# Patient Record
(demographics unavailable — no encounter records)

---

## 2025-04-25 NOTE — ASSESSMENT
[FreeTextEntry1] : 1.  Hypothyroidism secondary to Hashimoto's thyroiditis - Stop levothyroxine 50 mcg daily in 5/2024 as it was started for mild subclinical hypothyroidism with TSH 5.57 during fertility workup. -Check TSH and free T4 today to reassess need for levothyroxine.  We discussed indications for starting levothyroxine such as TSH more than 10, specific significant symptoms of hypothyroidism with elevated TSH, or active plans for pregnancy.  She does not have any active plans for pregnancy at this time, using IUD.  2.  Hyperlipidemia LDL as high as 189 in 9/2023, recent labs 5/10/2024 with . -Continue lifestyle modifications for lipid lowering, check lipid panel today.  3.  PCOS Using IUD for contraception. - Check testosterone level.  Complaining of hormonal acne on her face.  If testosterone level is elevated, we discussed potentially trying spironolactone to see if any improvement.  RTC in 1 year.  Sooner if needed.  Nathalia Benson MD [facility] at [address], [address] Ph: [phone] Fax: [phone]

## 2025-04-25 NOTE — HISTORY OF PRESENT ILLNESS
[FreeTextEntry1] : CHIEF COMPLAINT: Hypothyroidism   HISTORY OF PRESENTING ILLNESS: The patient is a 34-year-old female being seen in the office today for evaluation of hypothyroidism.  Also with hyperlipidemia.  She was diagnosed with hypothyroidism secondary to Hashimoto's thyroiditis in 2019 during workup for infertility, when she was noted to have a TSH of 5.57.  She had been taking levothyroxine 50 mcg daily until May 2024, when we decided to trial stopping levothyroxine. TFTs from 5/10/2024: TSH 1.18, free T4 1.58. She has 2 kids, born in 3/20/2022 and 8/20/2023.   No further plans for pregnancy.  After stopping levothyroxine, she is reports that she feels pretty much the same.  Has not repeated labs after stopping medication.  Complaining of skin breaking out/acne.  Complains of always feeling tired.  No constipation or diarrhea, endorses heat intolerance.  No palpitations or tremors, weight is stable, no hair changes, no neck swelling, no dysphagia or dyspnea or change in voice.  No family history of thyroid disease or thyroid cancer. No history of biotin intake. No personal history of radiation exposure in the head and neck area. No known history of thyroid nodules.  Thyroid US in the past with heterogeneity without discrete nodules.  Hyperlipidemia: Not on medication.   from 5/10/2024.

## 2025-04-25 NOTE — PHYSICAL EXAM
[TextEntry] : PHYSICAL EXAMINATION: Vital signs from today's encounter reviewed.  GENERAL: No acute distress, clinically eukinetic, normal appearance HEAD: Normocephalic, atraumatic EYES: conjunctivae are pink and moist, no icterus, no proptosis  NECK: Mild thyroid fullness, non-tender, no adenopathy CARDIOVASCULAR: well-perfused extremities, no peripheral edema RESPIRATORY: normal chest expansion with good pulmonary effort, no acute respiratory distress NEUROLOGIC: alert and oriented, no evident focal deficits, no tremors  PSYCHIATRIC: mood and affect are normal